# Patient Record
Sex: MALE | Race: WHITE | NOT HISPANIC OR LATINO | URBAN - METROPOLITAN AREA
[De-identification: names, ages, dates, MRNs, and addresses within clinical notes are randomized per-mention and may not be internally consistent; named-entity substitution may affect disease eponyms.]

---

## 2021-02-11 ENCOUNTER — EMERGENCY (EMERGENCY)
Facility: HOSPITAL | Age: 51
LOS: 1 days | Discharge: HOME | End: 2021-02-11
Attending: EMERGENCY MEDICINE | Admitting: EMERGENCY MEDICINE
Payer: COMMERCIAL

## 2021-02-11 VITALS
HEIGHT: 71 IN | TEMPERATURE: 98 F | OXYGEN SATURATION: 98 % | WEIGHT: 229.94 LBS | HEART RATE: 80 BPM | RESPIRATION RATE: 18 BRPM | DIASTOLIC BLOOD PRESSURE: 87 MMHG | SYSTOLIC BLOOD PRESSURE: 146 MMHG

## 2021-02-11 VITALS
OXYGEN SATURATION: 96 % | SYSTOLIC BLOOD PRESSURE: 128 MMHG | DIASTOLIC BLOOD PRESSURE: 85 MMHG | RESPIRATION RATE: 18 BRPM | TEMPERATURE: 98 F | HEART RATE: 73 BPM

## 2021-02-11 PROCEDURE — 99284 EMERGENCY DEPT VISIT MOD MDM: CPT | Mod: 25

## 2021-02-11 PROCEDURE — 99284 EMERGENCY DEPT VISIT MOD MDM: CPT

## 2021-02-11 PROCEDURE — 72100 X-RAY EXAM L-S SPINE 2/3 VWS: CPT

## 2021-02-11 PROCEDURE — 72100 X-RAY EXAM L-S SPINE 2/3 VWS: CPT | Mod: 26

## 2021-02-11 PROCEDURE — 71046 X-RAY EXAM CHEST 2 VIEWS: CPT | Mod: 26

## 2021-02-11 PROCEDURE — 71046 X-RAY EXAM CHEST 2 VIEWS: CPT

## 2021-02-11 RX ORDER — ARIPIPRAZOLE 15 MG/1
0 TABLET ORAL
Qty: 0 | Refills: 0 | DISCHARGE

## 2021-02-11 RX ORDER — CLONAZEPAM 1 MG
1 TABLET ORAL
Qty: 0 | Refills: 0 | DISCHARGE

## 2021-02-11 RX ORDER — CYCLOBENZAPRINE HYDROCHLORIDE 10 MG/1
10 TABLET, FILM COATED ORAL ONCE
Refills: 0 | Status: COMPLETED | OUTPATIENT
Start: 2021-02-11 | End: 2021-02-11

## 2021-02-11 RX ORDER — AMLODIPINE BESYLATE 2.5 MG/1
0 TABLET ORAL
Qty: 0 | Refills: 0 | DISCHARGE

## 2021-02-11 RX ORDER — KETOROLAC TROMETHAMINE 30 MG/ML
60 SYRINGE (ML) INJECTION ONCE
Refills: 0 | Status: DISCONTINUED | OUTPATIENT
Start: 2021-02-11 | End: 2021-02-11

## 2021-02-11 RX ORDER — LIDOCAINE 4 G/100G
1 CREAM TOPICAL ONCE
Refills: 0 | Status: COMPLETED | OUTPATIENT
Start: 2021-02-11 | End: 2021-02-11

## 2021-02-11 RX ADMIN — LIDOCAINE 1 PATCH: 4 CREAM TOPICAL at 16:09

## 2021-02-11 RX ADMIN — CYCLOBENZAPRINE HYDROCHLORIDE 10 MILLIGRAM(S): 10 TABLET, FILM COATED ORAL at 16:09

## 2021-02-11 NOTE — ED PROVIDER NOTE - OBJECTIVE STATEMENT
51 y/o M with hx of HTN, depression presents with c/o back pain today. Pt states that he tripped 49 y/o M with hx of HTN, anxiety presents with c/o back pain today. Pt states that he tripped while walking backwards and fell backwards hitting his mid back on the arm rest of a chair. States that he has been having mid back pain radiating around to the front/chest, worse with movement or deep inspiration. States that he took tylenol earlier without relief. Denies head trauma, LOC, use of blood thinners, bowel/bladder incontinence, open wounds, numbness, tingling, SOB, chest pain, N/V, abdominal pain, cough, fever or other symptoms.

## 2021-02-11 NOTE — ED PROVIDER NOTE - RESPIRATORY, MLM
Breath sounds clear and equal bilaterally. No respiratory distress. No chest wall/ribs B ttp/ecchymosis/stepoffs noted

## 2021-02-11 NOTE — ED PROVIDER NOTE - PROGRESS NOTE DETAILS
Wilfredo AS for Dr. Lozano: 49 y/o male with a PMHx of HTN on Amlodipine presents to the ED s/p fall x 5 hours PTA. Pt states that he was walking backwards, tripped over his own feet and fell back and hit his back on the metal armrest of a chair. Pt now c/o mild lower thoracic pain and severe b/l rib pain. Pt states he notices the rib pain more when he takes a breath, pain started after hitting the chair. Pt feels his rib pain is getting worse. Denies head injury or LOC. Pt states that he did take a Percocet 1 hour PTA with no relief, does state that the percocet was years old. Nonsmoker. PCP: Mak. PE: WD, WN, NAD. heart s1, s2, rrr. lungs cta, ribs minimal tender left lateral, abd s/nt. no cvat, spine: mild tender lower thoracic, nt cervical or lumbar spine, extremities non tender, no foot drop, neuro: no motor or sensory deficits. Wilfredo AS for Dr. Lozano: 49 y/o male with a PMHx of HTN on Amlodipine presents to the ED s/p fall x 5 hours PTA. Pt states that he was walking backwards, tripped over his own feet and fell back and hit his back on the metal armrest of a chair. Pt now c/o mild lower thoracic pain and severe b/l rib pain. Pt states he notices the rib pain more when he takes a breath, pain started after hitting the chair. Pt feels his rib pain is getting worse. Denies head injury or LOC. Pt states that he did take a Percocet 1 hour PTA with no relief, does state that the percocet was years old. Nonsmoker. PCP: Mak. PE: WD, WN, NAD. NCAT, heart s1, s2, rrr. lungs cta, ribs minimal tender left lateral, abd s/nt. no cvat, spine: mild tender lower thoracic, nt cervical or lumbar spine, extremities non tender, full rom, no foot drop, neuro: no motor or sensory deficits. skin warm dry no ecchymosis, abrasion or laceration Istop checked, no recent narcotic prescription noted. Reevaluated patient at bedside.  Patient feeling much improved.  Discussed the results of all diagnostic testing in ED and copies of all reports given. Will d/c home with rx for percocet, wbat, f/u ortho.  An opportunity to ask questions was given.  Discussed the importance of prompt, close medical follow-up.  Patient will return with any changes, concerns or persistent / worsening symptoms.  Understanding of all instructions verbalized.

## 2021-02-11 NOTE — ED ADULT NURSE NOTE - NSIMPLEMENTINTERV_GEN_ALL_ED
Implemented All Fall Risk Interventions:  [place] to call system. Call bell, personal items and telephone within reach. Instruct patient to call for assistance. Room bathroom lighting operational. Non-slip footwear when patient is off stretcher. Physically safe environment: no spills, clutter or unnecessary equipment. Stretcher in lowest position, wheels locked, appropriate side rails in place. Provide visual cue, wrist band, yellow gown, etc. Monitor gait and stability. Monitor for mental status changes and reorient to person, place, and time. Review medications for side effects contributing to fall risk. Reinforce activity limits and safety measures with patient and family.

## 2021-02-11 NOTE — ED ADULT NURSE NOTE - OBJECTIVE STATEMENT
pt was at work and tripped and became air born and fell down  on top of back of hard chair pains to left posterior lower rib cage and pains across b/l lower anterior rib cage with deep breathing  few red marks to left side of back no bruising  no crepitus  to posterior ribs pt aaox3 skin warm and dry no resp distress lungs clear and equal b/l ascultation abd soft non tender + bs;  pt denies head trauma   pt initially wanted Toradol and then declined MD aware  pt became pale on stretcher due to pains vs as charted head lowered and pt felt better

## 2021-02-11 NOTE — ED PROVIDER NOTE - CLINICAL SUMMARY MEDICAL DECISION MAKING FREE TEXT BOX
51 yo M with hx of htn, anxiety sp trip and fall backwards hitting his mid back on the arm of chair c/o mid back pain radiating around to the front/ribs, worse with movement/deep inspiration; no sob/cp/abd pain/weakness/numbness/incontinence; will get xrays, pain meds, muscle relaxers, f/u ortho

## 2021-02-11 NOTE — ED PROVIDER NOTE - CARE PROVIDER_API CALL
Duane Gibson)  Orthopaedic Surgery  [address] [place] [address]  Phone: [phone]  Fax: [phone]  Follow Up Time: 1-3 Days    YOUR PMD,   Phone: (   )    -  Fax: (   )    -  Follow Up Time: 1-3 Days

## 2021-02-11 NOTE — ED PROVIDER NOTE - PROVIDER TOKENS
PROVIDER:[TOKEN:[[id number]],FOLLOWUP:[1-3 Days]],FREE:[LAST:[YOUR PMD],PHONE:[(   )    -],FAX:[(   )    -],FOLLOWUP:[1-3 Days]]

## 2021-02-11 NOTE — ED PROVIDER NOTE - MUSCULOSKELETAL, MLM
Spine appears normal, range of motion is not limited, no muscle or joint tenderness, no point tenderness to thoracic/lumbar spine on exam, no midline tenderness, no stepoffs/ecchymosis noted, skin intact, FROM BUE & BLE, NVI

## 2021-02-11 NOTE — ED ADULT TRIAGE NOTE - CHIEF COMPLAINT QUOTE
Patient tripped and fell backwards landing with mid back on back of chair at approx 11am. denies head injury and LOC. complaining of midback pain radiating around to front. patient reports pain is worse with movement and deep breath.

## 2021-02-11 NOTE — ED PROVIDER NOTE - PATIENT PORTAL LINK FT
You can access the FollowMyHealth Patient Portal offered by [facility] by registering at the following website: [email or web address]. By joining [employer]’s FollowMyHealth portal, you will also be able to view your health information using other applications (apps) compatible with our system.

## 2021-02-11 NOTE — ED PROVIDER NOTE - NSFOLLOWUPINSTRUCTIONS_ED_ALL_ED_FT
Follow up with Orthopedist in 1-2 days for re-evaluation, ongoing care and treatment. Rest, ice/warm compresses, weight bearing as tolerated, take pain medications as prescribed. Take motrin 600mg every 6 hours with food as needed for pain. Avoid heavy lifting or other strenuous activities till symptoms resolve. If having worsening of symptoms, numbness, tingling, bowel or bladder incontinence, shortness of breath or other related symptoms, RETURN TO THE ER IMMEDIATELY.     Back Pain    WHAT YOU NEED TO KNOW:    Back pain is common. You may have back pain and muscle spasms. You may feel sore or stiff on one or both sides of your back. The pain may spread to your lower body. Conditions that affect the spine, joints, or muscles can cause back pain. These may include arthritis, spinal stenosis (narrowing of the spinal column), muscle tension, or breakdown of the spinal discs.    DISCHARGE INSTRUCTIONS:    Call your local emergency number (911 in the [place]) if:   •You have severe back pain with chest pain.      •You cannot control your urine or bowel movements.      •Your pain becomes so severe that you cannot walk.      Return to the emergency department if:   •You have pain, numbness, or weakness in one or both legs.      •You have severe back pain, nausea, and vomiting.      •You have severe back pain that spreads to your side or genital area.      Call your doctor if:   •You have back pain that does not get better with rest and pain medicine.      •You have a fever.      •You have pain that worsens when you are on your back or when you rest.      •You have pain that worsens when you cough or sneeze.      •You lose weight without trying.      •You have questions or concerns about your condition or care.      Medicines: You may need any of the following:   •NSAIDs, such as ibuprofen, help decrease swelling, pain, and fever. This medicine is available with or without a doctor's order. NSAIDs can cause stomach bleeding or kidney problems in certain people. If you take blood thinner medicine, always ask your healthcare provider if NSAIDs are safe for you. Always read the medicine label and follow directions.      •Acetaminophen decreases pain and fever. It is available without a doctor's order. Ask how much to take and how often to take it. Follow directions. Read the labels of all other medicines you are using to see if they also contain acetaminophen, or ask your doctor or pharmacist. Acetaminophen can cause liver damage if not taken correctly. Do not use more than 4 grams (4,000 milligrams) total of acetaminophen in one day.       •Muscle relaxers help decrease muscle spasms and back pain.      •Prescription pain medicine may be given. Ask your healthcare provider how to take this medicine safely. Some prescription pain medicines contain acetaminophen. Do not take other medicines that contain acetaminophen without talking to your healthcare provider. Too much acetaminophen may cause liver damage. Prescription pain medicine may cause constipation. Ask your healthcare provider how to prevent or treat constipation.       •Take your medicine as directed. Contact your healthcare provider if you think your medicine is not helping or if you have side effects. Tell him or her if you are allergic to any medicine. Keep a list of the medicines, vitamins, and herbs you take. Include the amounts, and when and why you take them. Bring the list or the pill bottles to follow-up visits. Carry your medicine list with you in case of an emergency.      How to manage your back pain:   •Apply ice on your back for 15 to 20 minutes every hour or as directed. Use an ice pack, or put crushed ice in a plastic bag. Cover it with a towel before you apply it to your skin. Ice helps prevent tissue damage and decreases pain.      •Apply heat on your back for 20 to 30 minutes every 2 hours for as many days as directed. Heat helps decrease pain and muscle spasms.      •Stay active as much as you can without causing more pain. Bed rest could make your back pain worse. Avoid heavy lifting until your pain is gone.      •Go to physical therapy as directed. A physical therapist can teach you exercises to help improve movement and strength, and to decrease pain.      Follow up with your doctor in 2 weeks, or as directed: You might need to see a specialist. Write down your questions so you remember to ask them during your visits.

## 2021-02-12 NOTE — POST DISCHARGE NOTE - DETAILS:
Still has soreness to back is using heating pad and taking OTC, has not needed the prescribed narcotic as yet.

## 2021-10-22 PROBLEM — F41.9 ANXIETY DISORDER, UNSPECIFIED: Chronic | Status: ACTIVE | Noted: 2021-02-11

## 2021-10-22 PROBLEM — I10 ESSENTIAL (PRIMARY) HYPERTENSION: Chronic | Status: ACTIVE | Noted: 2021-02-11

## 2021-10-25 ENCOUNTER — NON-APPOINTMENT (OUTPATIENT)
Age: 51
End: 2021-10-25

## 2021-10-25 ENCOUNTER — APPOINTMENT (OUTPATIENT)
Dept: PULMONOLOGY | Facility: CLINIC | Age: 51
End: 2021-10-25
Payer: COMMERCIAL

## 2021-10-25 VITALS
BODY MASS INDEX: 32.2 KG/M2 | DIASTOLIC BLOOD PRESSURE: 79 MMHG | OXYGEN SATURATION: 95 % | SYSTOLIC BLOOD PRESSURE: 125 MMHG | HEIGHT: 71 IN | WEIGHT: 230 LBS | TEMPERATURE: 97.6 F | HEART RATE: 84 BPM

## 2021-10-25 PROBLEM — Z00.00 ENCOUNTER FOR PREVENTIVE HEALTH EXAMINATION: Status: ACTIVE | Noted: 2021-10-25

## 2021-10-25 PROCEDURE — 99204 OFFICE O/P NEW MOD 45 MIN: CPT

## 2021-10-25 RX ORDER — CLONAZEPAM 0.5 MG/1
0.5 TABLET, ORALLY DISINTEGRATING ORAL
Qty: 90 | Refills: 0 | Status: ACTIVE | COMMUNITY
Start: 2021-06-07

## 2021-10-25 RX ORDER — AMLODIPINE BESYLATE 2.5 MG/1
2.5 TABLET ORAL
Refills: 0 | Status: ACTIVE | COMMUNITY

## 2021-10-25 RX ORDER — ROSUVASTATIN CALCIUM 40 MG/1
40 TABLET, FILM COATED ORAL
Refills: 0 | Status: ACTIVE | COMMUNITY

## 2021-10-25 RX ORDER — BENZONATATE 200 MG/1
200 CAPSULE ORAL
Qty: 20 | Refills: 0 | Status: ACTIVE | COMMUNITY
Start: 2021-10-07

## 2021-10-25 RX ORDER — PAROXETINE HYDROCHLORIDE 40 MG/1
40 TABLET, FILM COATED ORAL
Refills: 0 | Status: ACTIVE | COMMUNITY

## 2021-10-25 NOTE — HISTORY OF PRESENT ILLNESS
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

## 2021-10-25 NOTE — HISTORY OF PRESENT ILLNESS
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

## 2021-10-25 NOTE — DISCUSSION/SUMMARY
[FreeTextEntry1] : Mr Horowitz has persistent cough and symptoms of obstructive airways disease likely sequela of a viral infection.  He had Covid infection earlier and subsequently was vaccinated and repeat Covid testing is negative so I do not think Covid is a diagnosis.  There does not seem to be significant symptoms of bacterial infection so no antibiotics are indicated.  I have asked him to get a chest x-ray to fully define his anatomy.  I have asked him to Saw's Symbicort 82 puffs twice a day for its long-acting albuterol and inhaled corticosteroid effects.  We will also start a Medrol dose pack.  We will continue to use albuterol every 4 hours as needed.  I have asked the patient to use Robitussin-DM as directed.  The patient will see me in approximately 3 weeks time.\par The patient understands and agrees with plan of care.\par Today's office visit encompassed 46  minutes. I conducted an extensive history ,physical exam and reviewed diagnosis and treatment options  including diagnostic tests,radiologic studies including cat scans  and the use of prescription medication.

## 2021-10-25 NOTE — REASON FOR VISIT
[Initial] : an initial visit [Cough] : cough [Shortness of Breath] : shortness of breath [Wheezing] : wheezing [TextBox_44] : assessment. Pt has been experiencing a dry cough that sometimes presents clear phlegm, SOB in exertion and wheezing. Pt has not hx of asthma or any other pulmonary conditions. Pt received albuterol inhaler from [facility], pt states he uses it twice a day and it helps subsides his sx. Pt also reports that he does not sleep good at night.  [TextBox_13] : Dr. Arian Corado

## 2021-10-26 ENCOUNTER — NON-APPOINTMENT (OUTPATIENT)
Age: 51
End: 2021-10-26

## 2021-11-16 ENCOUNTER — APPOINTMENT (OUTPATIENT)
Dept: PULMONOLOGY | Facility: CLINIC | Age: 51
End: 2021-11-16
Payer: COMMERCIAL

## 2021-11-16 VITALS
HEART RATE: 87 BPM | OXYGEN SATURATION: 95 % | BODY MASS INDEX: 32.2 KG/M2 | WEIGHT: 230 LBS | TEMPERATURE: 97.5 F | SYSTOLIC BLOOD PRESSURE: 114 MMHG | DIASTOLIC BLOOD PRESSURE: 73 MMHG | HEIGHT: 71 IN

## 2021-11-16 DIAGNOSIS — J20.9 ACUTE BRONCHITIS, UNSPECIFIED: ICD-10-CM

## 2021-11-16 PROCEDURE — 99214 OFFICE O/P EST MOD 30 MIN: CPT

## 2021-11-16 RX ORDER — SODIUM SULFATE, MAGNESIUM SULFATE, AND POTASSIUM CHLORIDE 17.75; 2.7; 2.25 G/1; G/1; G/1
1479-225-188 TABLET ORAL
Qty: 24 | Refills: 0 | Status: DISCONTINUED | COMMUNITY
Start: 2021-05-03 | End: 2021-11-16

## 2021-11-16 NOTE — REASON FOR VISIT
[Follow-Up] : a follow-up visit [Asthma] : asthma [TextBox_44] : 3 week follow up. Patient states that the methylprednisolone helped him and that he is continuing  to take the medication. Patient is still having a dry cough

## 2021-11-16 NOTE — DISCUSSION/SUMMARY
[FreeTextEntry1] : Mr. Horowitz has a resolving bronchitis likely from a viral infection.  He had Covid in the past so I think it was not Covid.  Overall he is doing very well.  I would like him to continue the Symbicort 80 mg 2 sprays twice a day for 1 more month.  If his symptoms fully resolve he can stop it at that time.  Patient understands and agrees with this plan of care.

## 2021-12-21 ENCOUNTER — APPOINTMENT (OUTPATIENT)
Dept: PULMONOLOGY | Facility: CLINIC | Age: 51
End: 2021-12-21
Payer: COMMERCIAL

## 2021-12-21 VITALS
WEIGHT: 230 LBS | TEMPERATURE: 97.8 F | SYSTOLIC BLOOD PRESSURE: 116 MMHG | OXYGEN SATURATION: 96 % | HEART RATE: 76 BPM | HEIGHT: 71 IN | BODY MASS INDEX: 32.2 KG/M2 | DIASTOLIC BLOOD PRESSURE: 77 MMHG

## 2021-12-21 PROCEDURE — 99214 OFFICE O/P EST MOD 30 MIN: CPT

## 2021-12-21 RX ORDER — METHYLPREDNISOLONE 4 MG/1
4 TABLET ORAL
Qty: 1 | Refills: 1 | Status: DISCONTINUED | COMMUNITY
Start: 2021-10-25 | End: 2021-12-21

## 2021-12-21 NOTE — REASON FOR VISIT
[Follow-Up] : a follow-up visit [Asthma] : asthma [Cough] : cough [Shortness of Breath] : shortness of breath [TextBox_44] : 1 month. Pt states sx have worsened since last visit. He is experience SOB with minimal activity and a dry cough. No other pulmonary complaints.

## 2021-12-21 NOTE — DISCUSSION/SUMMARY
[FreeTextEntry1] : Mr. Horowitz has acute asthmatic bronchitis.  He had Covid in the past was unlikely this is residual from same.  His status waxes and wanes.  Today has some minimal wheezing.  He does find relief with the albuterol.  We will continue the Symbicort 80  2 sprays twice a day.  We will start this Spiriva 2.5 mg 2 sprays every morning.  He will continue the albuterol on an as-needed basis.  Patient understands and agrees with this plan of care.\par The patient understands and agrees with plan of care.\par Today's office visit encompassed 32 minutes. I conducted an extensive history ,physical exam and reviewed diagnosis and treatment options  including diagnostic tests,radiologic studies including  cat scans  and the use of prescription medication.

## 2021-12-21 NOTE — HISTORY OF PRESENT ILLNESS
[Never] : never [TextBox_4] : 51 male hx asthma and was improving until 2 weeks ago and began inc sob \par today feels good but yesterday ++ dyspnea\par no fever no chest pain no tightness some wheeze with deep breath\par on symbicort bid  and albuterol prn

## 2021-12-21 NOTE — PHYSICAL EXAM
[No Acute Distress] : no acute distress [Normal Oropharynx] : normal oropharynx [Normal Appearance] : normal appearance [No Neck Mass] : no neck mass [Normal Rate/Rhythm] : normal rate/rhythm [Normal S1, S2] : normal s1, s2 [No Murmurs] : no murmurs [No Resp Distress] : no resp distress [Clear to Auscultation Bilaterally] : clear to auscultation bilaterally [No Abnormalities] : no abnormalities [Benign] : benign [Normal Gait] : normal gait [No Clubbing] : no clubbing [No Cyanosis] : no cyanosis [No Edema] : no edema [FROM] : FROM [Normal Color/ Pigmentation] : normal color/ pigmentation [No Focal Deficits] : no focal deficits [Oriented x3] : oriented x3 [Normal Affect] : normal affect [TextBox_68] : Good aeration with diffuse wheezes bilaterally

## 2022-02-01 ENCOUNTER — APPOINTMENT (OUTPATIENT)
Dept: PULMONOLOGY | Facility: CLINIC | Age: 52
End: 2022-02-01
Payer: COMMERCIAL

## 2022-02-01 VITALS
TEMPERATURE: 98.1 F | HEIGHT: 71 IN | BODY MASS INDEX: 32.2 KG/M2 | WEIGHT: 230 LBS | DIASTOLIC BLOOD PRESSURE: 80 MMHG | SYSTOLIC BLOOD PRESSURE: 118 MMHG | HEART RATE: 96 BPM | OXYGEN SATURATION: 93 %

## 2022-02-01 PROCEDURE — 99214 OFFICE O/P EST MOD 30 MIN: CPT

## 2022-02-01 RX ORDER — ALBUTEROL SULFATE 90 UG/1
108 (90 BASE) INHALANT RESPIRATORY (INHALATION) EVERY 4 HOURS
Qty: 1 | Refills: 6 | Status: ACTIVE | COMMUNITY
Start: 2021-10-07 | End: 1900-01-01

## 2022-02-01 RX ORDER — BUDESONIDE AND FORMOTEROL FUMARATE DIHYDRATE 80; 4.5 UG/1; UG/1
80-4.5 AEROSOL RESPIRATORY (INHALATION) TWICE DAILY
Qty: 1 | Refills: 3 | Status: ACTIVE | COMMUNITY
Start: 2021-10-25 | End: 1900-01-01

## 2022-02-01 RX ORDER — TIOTROPIUM BROMIDE INHALATION SPRAY 3.12 UG/1
2.5 SPRAY, METERED RESPIRATORY (INHALATION) DAILY
Qty: 1 | Refills: 3 | Status: ACTIVE | COMMUNITY
Start: 2022-02-01 | End: 1900-01-01

## 2022-02-01 NOTE — PHYSICAL EXAM
[No Acute Distress] : no acute distress [Normal Oropharynx] : normal oropharynx [Normal Appearance] : normal appearance [No Neck Mass] : no neck mass [Normal Rate/Rhythm] : normal rate/rhythm [Normal S1, S2] : normal s1, s2 [No Murmurs] : no murmurs [No Resp Distress] : no resp distress [Clear to Auscultation Bilaterally] : clear to auscultation bilaterally [Wheeze] : wheeze [Rhonchi] : rhonchi [No Abnormalities] : no abnormalities [Benign] : benign [Normal Gait] : normal gait [No Clubbing] : no clubbing [No Cyanosis] : no cyanosis [No Edema] : no edema [FROM] : FROM [Normal Color/ Pigmentation] : normal color/ pigmentation [No Focal Deficits] : no focal deficits [Oriented x3] : oriented x3 [Normal Affect] : normal affect [TextBox_68] : Diffuse wheezes and rhonchi all areas

## 2022-02-01 NOTE — REASON FOR VISIT
[Follow-Up] : a follow-up visit [Asthma] : asthma [Cough] : cough [Shortness of Breath] : shortness of breath [Wheezing] : wheezing [TextBox_44] : 1 month. Pt states sx have worsened. Pt is coughing up yellow phlegm, SOB at rest and experiencing chest pain from coughing. Pt states Symbicort and Spiriva were not effective.

## 2022-02-01 NOTE — HISTORY OF PRESENT ILLNESS
[Never] : never [TextBox_4] : 51 male sp covid 18 month ago for fu re dyspnea\par last evening ++cough and sob\par stool incontinence and emesis from cough \par no fever ++phlegm yellow \par remain on spiriva and albuterol\par no improvement with symbicort

## 2022-02-01 NOTE — DISCUSSION/SUMMARY
[FreeTextEntry1] : Mr. Horowitz has persistent obstructive airways disease.  He was stricken with COVID 18 months ago and has had cough and congestion since October 2021.  We will continue the Symbicort 82 sprays twice a day and the Spiriva 2 sprays every day.  We are going to repeat the Zithromax as last time he found this improved his symptoms greatly.  We will give him the Z-Eber as directed.  I have also asked him to start prednisone 40 mg tapering down to 0 over the next 16 days.  The patient will see me in approximately 3 weeks time check on his progress.  Patient understands and agrees to this plan of care.\par The patient understands and agrees with plan of care.\par Today's office visit encompassed 32 minutes. I conducted an extensive history ,physical exam and reviewed diagnosis and treatment options  including diagnostic tests,radiologic studies including  cat scans  and the use of prescription medication.

## 2022-02-22 ENCOUNTER — APPOINTMENT (OUTPATIENT)
Dept: PULMONOLOGY | Facility: CLINIC | Age: 52
End: 2022-02-22
Payer: COMMERCIAL

## 2022-02-22 VITALS
OXYGEN SATURATION: 96 % | DIASTOLIC BLOOD PRESSURE: 76 MMHG | BODY MASS INDEX: 32.2 KG/M2 | WEIGHT: 230 LBS | HEIGHT: 71 IN | SYSTOLIC BLOOD PRESSURE: 116 MMHG | HEART RATE: 78 BPM | TEMPERATURE: 97 F

## 2022-02-22 DIAGNOSIS — R05.3 CHRONIC COUGH: ICD-10-CM

## 2022-02-22 DIAGNOSIS — U09.9 POST COVID-19 CONDITION, UNSPECIFIED: ICD-10-CM

## 2022-02-22 DIAGNOSIS — J45.909 UNSPECIFIED ASTHMA, UNCOMPLICATED: ICD-10-CM

## 2022-02-22 PROCEDURE — 99214 OFFICE O/P EST MOD 30 MIN: CPT

## 2022-02-22 RX ORDER — PREDNISONE 5 MG/1
5 TABLET ORAL
Qty: 72 | Refills: 2 | Status: DISCONTINUED | COMMUNITY
Start: 2022-02-01 | End: 2022-02-22

## 2022-02-22 RX ORDER — AZITHROMYCIN 250 MG/1
250 TABLET, FILM COATED ORAL
Qty: 1 | Refills: 1 | Status: DISCONTINUED | COMMUNITY
Start: 2022-02-01 | End: 2022-02-22

## 2022-02-22 NOTE — REASON FOR VISIT
[Follow-Up] : a follow-up visit [Asthma] : asthma [Cough] : cough [Shortness of Breath] : shortness of breath [Wheezing] : wheezing [TextBox_44] : 3 weeks. Pt states sx are slowly subsiding, Zpak and Prednisone did help. Today pt presents slight wheezing, dry cough that’s less persistent and SOB at times but is controllable.

## 2022-02-22 NOTE — HISTORY OF PRESENT ILLNESS
[Never] : never [TextBox_4] : 51 male hx asthma and acute bronchitis for fu \par today feels somewhat improved \par  no chest pain cont dry cough  no fever \par almost none\par remain on symbicort and spiriva and albuterol  prn no albuterol in 1 weeks\par off steroids

## 2022-02-22 NOTE — DISCUSSION/SUMMARY
[FreeTextEntry1] : Mr. Horowitz has chronic bronchitis which is overall improving.  Stable tapered off the steroids and remains on the Symbicort and Spiriva.  He does not take the Tessalon Perles for cough.  I have asked him to complete the Spiriva but not to renew it.  I had like him to remain on the Symbicort 2 sprays twice a day at this time.  The patient understands and agrees with this plan of care.\par The patient understands and agrees with plan of care.\par Today's office visit encompassed 32 minutes. I conducted an extensive history ,physical exam and reviewed diagnosis and treatment options  including diagnostic tests,radiologic studies including  cat scans  and the use of prescription medication.

## 2022-03-25 ENCOUNTER — EMERGENCY (EMERGENCY)
Facility: HOSPITAL | Age: 52
LOS: 1 days | Discharge: HOME | End: 2022-03-25
Attending: EMERGENCY MEDICINE | Admitting: EMERGENCY MEDICINE
Payer: COMMERCIAL

## 2022-03-25 VITALS
HEIGHT: 70 IN | HEART RATE: 96 BPM | SYSTOLIC BLOOD PRESSURE: 129 MMHG | OXYGEN SATURATION: 96 % | DIASTOLIC BLOOD PRESSURE: 80 MMHG | RESPIRATION RATE: 16 BRPM | TEMPERATURE: 98 F | WEIGHT: 235.01 LBS

## 2022-03-25 PROCEDURE — 73030 X-RAY EXAM OF SHOULDER: CPT | Mod: 26,RT

## 2022-03-25 PROCEDURE — 99283 EMERGENCY DEPT VISIT LOW MDM: CPT | Mod: 25

## 2022-03-25 PROCEDURE — 73030 X-RAY EXAM OF SHOULDER: CPT

## 2022-03-25 PROCEDURE — 99284 EMERGENCY DEPT VISIT MOD MDM: CPT

## 2022-03-25 RX ORDER — LIDOCAINE 4 G/100G
1 CREAM TOPICAL ONCE
Refills: 0 | Status: COMPLETED | OUTPATIENT
Start: 2022-03-25 | End: 2022-03-25

## 2022-03-25 RX ADMIN — LIDOCAINE 1 PATCH: 4 CREAM TOPICAL at 12:05

## 2022-03-25 NOTE — ED PROVIDER NOTE - CARE PLAN
62 y/o male with superficial infection of right knee incision s/p Right TKA 10/21/19 with Dr Estes Principal Discharge DX:	Sprain of shoulder, right   1

## 2022-03-25 NOTE — ED PROVIDER NOTE - OBJECTIVE STATEMENT
51 year old male with history of HTN, HLD, and anxiety presents with right shoulder pain after injury at work this am. thought door was unlocked and pulled on it hard (but door was locked). felt pop in right shoulder when trying to pull the door open and now has diffuse shoulder pain. pain radiates to right upper trap and down right upper arm. pain 6/10 at rest, increases to 9 with movement. no n/t. took tylenol and motrin over the counter without much improvement of pain. right handed. denies other injuries or complaints   FELIX Corado

## 2022-03-25 NOTE — ED PROVIDER NOTE - CLINICAL SUMMARY MEDICAL DECISION MAKING FREE TEXT BOX
51 year old male with history of HTN, HLD, and anxiety presents with right shoulder pain after injury at work this am. thought door was unlocked and pulled on it hard (but door was locked). felt pop in right shoulder when trying to pull the door open and now has diffuse shoulder pain. pain radiates to right upper trap and down right upper arm. pain 6/10 at rest, increases to 9 with movement. no n/t. took tylenol and motrin over the counter without much improvement of pain. right handed. denies other injuries or complaints   PCP Mak  PE-VSS  Ext minor tenderness rt shoulder. No deformity. FROM pulses sensation intact.  Remainder unremarkable.    Imp Shoulder pain. Plan - Xray, sling, pain meds, ortho FU.

## 2022-03-25 NOTE — ED PROVIDER NOTE - MUSCULOSKELETAL, MLM
diffuse soft tissue tenderness to right shoulder. no ant fullness or deformity. limited overhead ROM due to pain. no elbow or wrist joint tenderness

## 2022-03-25 NOTE — ED PROVIDER NOTE - PROGRESS NOTE DETAILS
Reevaluated patient at bedside.  Patient feeling  improved.  Discussed the results of all diagnostic testing in ED. sling provided. referral to ortho provided.  driving, unable to give narcotic meds. small dose of percocet prescribed for severe pain. will continue motrin over the counter. An opportunity to ask questions was given.  Discussed the importance of prompt, close medical follow-up.  Patient will return with any changes, concerns or persistent / worsening symptoms.  Understanding of all instructions verbalized.

## 2022-03-25 NOTE — ED PROVIDER NOTE - CARE PROVIDER_API CALL
Duane Gibson)  Orthopaedic Surgery  [address] [place] [address]  Phone: [phone]  Fax: [phone]  Follow Up Time: 1-3 Days

## 2022-03-25 NOTE — ED ADULT NURSE NOTE - OBJECTIVE STATEMENT
patient has c/o right shoulder pain since 6am today, never had this complaint before and denies any trauma, pending xray, will continue to monitor.

## 2022-03-25 NOTE — ED PROVIDER NOTE - PATIENT PORTAL LINK FT
You can access the FollowMyHealth Patient Portal offered by [facility] by registering at the following website: [email or web address]. By joining [employer]’s FollowMyHealth portal, you will also be able to view your health information using other applications (apps) compatible with our system.

## 2022-03-25 NOTE — ED PROVIDER NOTE - NS ED ATTENDING STATEMENT MOD
This was a shared visit with the MOR. I reviewed and verified the documentation and independently performed the documented:

## 2022-03-25 NOTE — ED PROVIDER NOTE - NSFOLLOWUPINSTRUCTIONS_ED_ALL_ED_FT
sling  ice  motrin over the counter, 600 mg three times a day with food  SalanPas lidocaine patches over the counter, 12 hours on 12 hours off  follow up with orthopedics if pain continues, referral provided       Shoulder Sprain    WHAT YOU NEED TO KNOW:    A shoulder sprain happens when a ligament in your shoulder is stretched or torn. Ligaments are the tough tissues that connect bones. Ligaments allow you to lift, lower, and rotate your arm.    Shoulder Anatomy         DISCHARGE INSTRUCTIONS:    Return to the emergency department if:   •You feel severe pain in your shoulder when you move it, or it is touched.      •Your skin feels cold or clammy.      •You have numbness, tingling, or a feeling of pins and needles in your shoulder.       •The skin on your injured shoulder looks blue or pale.      Call your doctor if:   •You have new or increased swelling and pain in your shoulder.      •You have new or increased stiffness when you move your injured shoulder.      •Your symptoms do not improve within 5 to 7 days.       •You have questions or concerns about your condition or care.      Medicines: You may need any of the following:   •Acetaminophen decreases pain and fever. It is available without a doctor's order. Ask how much to take and how often to take it. Follow directions. Read the labels of all other medicines you are using to see if they also contain acetaminophen, or ask your doctor or pharmacist. Acetaminophen can cause liver damage if not taken correctly. Do not use more than 4 grams (4,000 milligrams) total of acetaminophen in one day.       •NSAIDs, such as ibuprofen, help decrease swelling, pain, and fever. This medicine is available with or without a doctor's order. NSAIDs can cause stomach bleeding or kidney problems in certain people. If you take blood thinner medicine, always ask your healthcare provider if NSAIDs are safe for you. Always read the medicine label and follow directions.      •Prescription pain medicine may be given. Ask your healthcare provider how to take this medicine safely. Some prescription pain medicines contain acetaminophen. Do not take other medicines that contain acetaminophen without talking to your healthcare provider. Too much acetaminophen may cause liver damage. Prescription pain medicine may cause constipation. Ask your healthcare provider how to prevent or treat constipation.       •Take your medicine as directed. Contact your healthcare provider if you think your medicine is not helping or if you have side effects. Tell him or her if you are allergic to any medicine. Keep a list of the medicines, vitamins, and herbs you take. Include the amounts, and when and why you take them. Bring the list or the pill bottles to follow-up visits. Carry your medicine list with you in case of an emergency.      Follow up with your doctor as directed: Write down your questions so you remember to ask them during your visits.     Self-care:   •Rest your shoulder so it can heal. Avoid moving your shoulder as your injury heals. This will help decrease the risk of more damage to your shoulder.      •Apply ice on your shoulder for 20 to 30 minutes every 2 hours or as directed. Use an ice pack, or put crushed ice in a plastic bag. Cover it with a towel before you apply it to your shoulder. Ice helps prevent tissue damage and decreases swelling and pain.      •Compress your shoulder as directed. Compression provides support and helps decrease swelling and movement so your shoulder can heal. For mild sprains, you may be given a sling to support your arm. You may need a padded brace or a plaster cast to hold your shoulder in place if the sprain is more serious.      How to wear a brace, sling, or splint: A brace, sling, or splint may be needed to limit your movement and protect your injured shoulder.    Shoulder Sling     •Wear your brace, sling, or splint as directed. You may need to wear it all the time and take it off only to bathe or do exercises. Ask your healthcare provider how long you should wear it.      •Keep your skin clean and dry. Padding under your armpit will help absorb sweat and prevent sores on your skin.      •Do not hunch your shoulders. This may cause pain. Keep your shoulders relaxed.      •Position the sling over your arm and hand so that it also covers your knuckles. This will help the sling support your wrist and hand. Position your wrist higher than your elbow. Your wrist may start to hurt or go numb if your sling is too short.      Physical therapy: A physical therapist teaches you exercises to help improve movement and strength, and to decrease pain.    Prevent another injury:   •Do not exercise when you are tired or in pain. Warm up and stretch before you exercise.      •Wear equipment to protect yourself when you play sports.      •Wear shoes that fit well and run on flat surfaces to prevent falls.

## 2022-04-05 ENCOUNTER — APPOINTMENT (OUTPATIENT)
Dept: PULMONOLOGY | Facility: CLINIC | Age: 52
End: 2022-04-05

## 2022-12-06 NOTE — ED ADULT NURSE NOTE - NS ED NURSE RECORD ANOTHER HT AND WT
Problem: At Risk for Falls  Goal: # Patient does not fall  Outcome: Outcome Not Met, Continue to Monitor  Goal: # Takes action to control fall-related risks  Outcome: Outcome Not Met, Continue to Monitor  Goal: # Verbalizes understanding of fall risk/precautions  Description: Document education using the patient education activity  Outcome: Outcome Not Met, Continue to Monitor     Problem: At Risk for Injury Due to Fall  Goal: # Patient does not fall  Outcome: Outcome Not Met, Continue to Monitor  Goal: # Takes action to control condition specific risks  Outcome: Outcome Not Met, Continue to Monitor  Goal: # Verbalizes understanding of fall-related injury personal risks  Description: Document education using the patient education activity  Outcome: Outcome Not Met, Continue to Monitor     Problem: Activity Intolerance  Goal: # Functional status is maintained or returned to baseline  Outcome: Outcome Not Met, Continue to Monitor  Goal: # Tolerates activity for d/c setting with no clinical problems  Outcome: Outcome Not Met, Continue to Monitor     Problem: Fluid Volume Excess  Goal: # Fluid Volume Excess Symptoms Resolved  Description: Treatment often consists of oxygen and respiratory support with diuretic therapy at doses that exceed usual dose (typically doubled).  Monitor patient response to treatment.    Acute dyspnea should resolve quickly if dose is adequate and kidney function is adequate. Dyspnea/SOB should only be observed with Activity after effective treatment. Patient should be able to lie down comfortably, without SOB.  Outcome: Outcome Not Met, Continue to Monitor  Goal: # Oxygenation is maintained (SpO2 greater than or equal to 90% or as ordered)  Outcome: Outcome Not Met, Continue to Monitor  Goal: # Verbalizes understanding of FVE management plan  Description: Document on Patient Education Activity  Outcome: Outcome Not Met, Continue to Monitor      Yes

## 2025-06-30 NOTE — ED PROVIDER NOTE - PRO INTERPRETER NEED 2
Patient been having a lot of hand pain -  Naproxen not helping would like steroids call in - that works for him . Please let him know ? Thank you   Laura Avila .   
Spoke with patients wife Zoila. Read her what Holly's response was, and she stated to go ahead and order the EMG. It is going to be better to know how bad things are and to know if he may need surgery. I told her we use Dr. Smith and that I would fax over the order to their office and to call within a half hour to give them time to receive it. I gave her their office # as well. I told her to call our office back when she gets a date and time so we can schedule a follow up to review the EMG. I told her we normally give about 1-2 weeks after EMG so it can be finalized and we have the report. She understood and had no further questions.     This was ordered and faxed.   
Spoke with patients wife. He did the 5 day course of steroid and it helped him a lot. After completing, 24 hours later the pain came right back. I asked her if he was using the braces at night, and if he bought the padded gloves for work and she stated he did. She said the naproxen doesn't seem to be doing anything and he is still in a lot of pain. She is wanting to know if another round of steroids could be ordered, or is there something else you can recommend for him to take? She is going to have him take Tylenol with the naproxen until they hear back from you.     If anything is called in, they use the [facility] on [address].   
English